# Patient Record
Sex: FEMALE | Race: WHITE | NOT HISPANIC OR LATINO | URBAN - METROPOLITAN AREA
[De-identification: names, ages, dates, MRNs, and addresses within clinical notes are randomized per-mention and may not be internally consistent; named-entity substitution may affect disease eponyms.]

---

## 2020-08-10 ENCOUNTER — TELEPHONE ENCOUNTER (OUTPATIENT)
Dept: URBAN - METROPOLITAN AREA CLINIC 9 | Facility: CLINIC | Age: 75
End: 2020-08-10

## 2020-08-10 ENCOUNTER — OFFICE VISIT (OUTPATIENT)
Dept: URBAN - METROPOLITAN AREA CLINIC 7 | Facility: CLINIC | Age: 75
End: 2020-08-10

## 2021-05-11 ENCOUNTER — OFFICE VISIT (OUTPATIENT)
Dept: URBAN - METROPOLITAN AREA SURGERY CENTER 5 | Facility: SURGERY CENTER | Age: 76
End: 2021-05-11

## 2021-06-22 ENCOUNTER — TELEPHONE ENCOUNTER (OUTPATIENT)
Dept: URBAN - METROPOLITAN AREA CLINIC 9 | Facility: CLINIC | Age: 76
End: 2021-06-22

## 2021-07-16 ENCOUNTER — OFFICE VISIT (OUTPATIENT)
Dept: URBAN - METROPOLITAN AREA CLINIC 7 | Facility: CLINIC | Age: 76
End: 2021-07-16

## 2022-07-07 ENCOUNTER — OFFICE VISIT (OUTPATIENT)
Dept: URBAN - METROPOLITAN AREA CLINIC 7 | Facility: CLINIC | Age: 77
End: 2022-07-07

## 2022-07-07 ENCOUNTER — TELEPHONE ENCOUNTER (OUTPATIENT)
Dept: URBAN - METROPOLITAN AREA CLINIC 9 | Facility: CLINIC | Age: 77
End: 2022-07-07

## 2022-07-08 ENCOUNTER — OFFICE VISIT (OUTPATIENT)
Dept: URBAN - METROPOLITAN AREA SURGERY CENTER 5 | Facility: SURGERY CENTER | Age: 77
End: 2022-07-08

## 2022-07-09 ENCOUNTER — TELEPHONE ENCOUNTER (OUTPATIENT)
Dept: URBAN - METROPOLITAN AREA CLINIC 121 | Facility: CLINIC | Age: 77
End: 2022-07-09

## 2022-07-10 ENCOUNTER — TELEPHONE ENCOUNTER (OUTPATIENT)
Dept: URBAN - METROPOLITAN AREA CLINIC 121 | Facility: CLINIC | Age: 77
End: 2022-07-10

## 2022-07-10 RX ORDER — DIAZEPAM 10 MG/1
TABLET ORAL
Refills: 0 | Status: ACTIVE | COMMUNITY
Start: 2012-06-13

## 2022-07-10 RX ORDER — CETIRIZINE HYDROCHLORIDE 10 MG/1
CAPSULE, LIQUID FILLED ORAL
Refills: 0 | Status: ACTIVE | COMMUNITY
Start: 2012-06-13

## 2022-07-10 RX ORDER — METOPROLOL SUCCINATE 50 MG/1
TABLET, EXTENDED RELEASE ORAL
Refills: 0 | Status: ACTIVE | COMMUNITY
Start: 2012-06-13

## 2022-07-10 RX ORDER — OMEPRAZOLE 20 MG/1
CAPSULE, DELAYED RELEASE ORAL TWICE A DAY
Refills: 3 | Status: ACTIVE | COMMUNITY
Start: 2012-06-13

## 2022-07-11 ENCOUNTER — OFFICE VISIT (OUTPATIENT)
Dept: URBAN - METROPOLITAN AREA SURGERY CENTER 5 | Facility: SURGERY CENTER | Age: 77
End: 2022-07-11

## 2022-07-13 ENCOUNTER — OFFICE VISIT (OUTPATIENT)
Dept: URBAN - METROPOLITAN AREA SURGERY CENTER 5 | Facility: SURGERY CENTER | Age: 77
End: 2022-07-13

## 2022-07-30 ENCOUNTER — TELEPHONE ENCOUNTER (OUTPATIENT)
Age: 77
End: 2022-07-30

## 2022-07-30 RX ORDER — OMEPRAZOLE 20 MG/1
2 (TWO) CAPSULE, DELAYED RELEASE ORAL
Qty: 0 | Refills: 2 | OUTPATIENT
Start: 2014-09-03 | End: 2014-10-03

## 2022-07-30 RX ORDER — PANTOPRAZOLE SODIUM 40 MG/1
1 (ONE) TABLET, DELAYED RELEASE ORAL
Qty: 0 | Refills: 13 | OUTPATIENT
Start: 2019-12-27 | End: 2020-08-10

## 2022-07-30 RX ORDER — ONDANSETRON HYDROCHLORIDE 4 MG/1
1 (ONE) TABLET, FILM COATED ORAL
Qty: 0 | Refills: 2 | OUTPATIENT
Start: 2012-09-06 | End: 2012-09-11

## 2022-07-30 RX ORDER — CEPHALEXIN 250 MG/5ML
5 CC FOR SUSPENSION ORAL
Qty: 0 | Refills: 2 | OUTPATIENT
Start: 2015-11-27 | End: 2015-12-07

## 2022-07-30 RX ORDER — POTASSIUM CHLORIDE 20 MEQ/15ML
15 SOLUTION ORAL
Qty: 0 | Refills: 11 | OUTPATIENT
Start: 2018-05-29 | End: 2019-06-03

## 2022-07-30 RX ORDER — CEPHALEXIN 250 MG/5ML
10 FOR SUSPENSION ORAL
Qty: 0 | Refills: 2 | OUTPATIENT
Start: 2019-08-13 | End: 2019-08-23

## 2022-07-30 RX ORDER — CEPHALEXIN 500 MG/1
1 (ONE) CAPSULE ORAL
Qty: 0 | Refills: 2 | OUTPATIENT
Start: 2015-12-17 | End: 2015-12-27

## 2022-07-30 RX ORDER — LACTOSE-REDUCED FOOD/FIBER 0.07 G-1.5
1 (ONE) LIQUID LIQUID (ML) ORAL
Qty: 0 | Refills: 2 | OUTPATIENT
Start: 2020-08-10 | End: 2020-11-08

## 2022-07-30 RX ORDER — FLUCONAZOLE 40 MG/ML
2.5 POWDER, FOR SUSPENSION ORAL DAILY
Qty: 0 | Refills: 2 | OUTPATIENT
Start: 2016-04-28 | End: 2016-05-08

## 2022-07-30 RX ORDER — LACTOSE-REDUCED FOOD/FIBER 0.07 G-1.5
1 (ONE) LIQUID LIQUID (ML) ORAL
Qty: 0 | Refills: 2 | OUTPATIENT
Start: 2016-08-31 | End: 2016-11-29

## 2022-07-30 RX ORDER — TRAMADOL HYDROCHLORIDE 50 MG/1
1 (ONE) TABLET ORAL
Qty: 0 | Refills: 2 | OUTPATIENT
Start: 2015-11-27 | End: 2015-12-02

## 2022-07-30 RX ORDER — FLUCONAZOLE 40 MG/ML
2.5 POWDER, FOR SUSPENSION ORAL DAILY
Qty: 0 | Refills: 2 | OUTPATIENT
Start: 2016-09-13 | End: 2016-09-23

## 2022-07-31 ENCOUNTER — TELEPHONE ENCOUNTER (OUTPATIENT)
Age: 77
End: 2022-07-31

## 2022-07-31 RX ORDER — MUPIROCIN 20 MG/G
1 (ONE) OINTMENT TOPICAL
Qty: 0 | Refills: 2 | Status: ACTIVE | COMMUNITY
Start: 2022-07-07

## 2022-07-31 RX ORDER — CEPHALEXIN 250 MG/5ML
10 FOR SUSPENSION ORAL
Qty: 0 | Refills: 2 | Status: ACTIVE | COMMUNITY
Start: 2019-08-13

## 2022-07-31 RX ORDER — LACTOSE-REDUCED FOOD/FIBER 0.07 G-1.5
1 (ONE) LIQUID (ML) ORAL
Qty: 0 | Refills: 2 | Status: ACTIVE | COMMUNITY
Start: 2016-08-31

## 2022-07-31 RX ORDER — TRAMADOL HYDROCHLORIDE 50 MG/1
1 (ONE) TABLET ORAL
Qty: 0 | Refills: 2 | Status: ACTIVE | COMMUNITY
Start: 2015-11-27

## 2022-07-31 RX ORDER — POTASSIUM CHLORIDE 20 MEQ/15ML
15 SOLUTION ORAL
Qty: 0 | Refills: 11 | Status: ACTIVE | COMMUNITY
Start: 2018-05-29

## 2022-07-31 RX ORDER — POTASSIUM CHLORIDE 20 MEQ/15ML
15 SOLUTION ORAL
Qty: 0 | Refills: 11 | Status: ACTIVE | COMMUNITY
Start: 2017-05-04

## 2022-07-31 RX ORDER — LACTOSE-REDUCED FOOD/FIBER 0.07 G-1.5
1 (ONE) LIQUID LIQUID (ML) ORAL
Qty: 0 | Refills: 2 | Status: ACTIVE | COMMUNITY
Start: 2020-08-10

## 2022-07-31 RX ORDER — ONDANSETRON HYDROCHLORIDE 4 MG/1
1 (ONE) TABLET, FILM COATED ORAL
Qty: 0 | Refills: 2 | Status: ACTIVE | COMMUNITY
Start: 2012-09-06

## 2022-07-31 RX ORDER — FLUCONAZOLE 40 MG/ML
2.5 POWDER, FOR SUSPENSION ORAL DAILY
Qty: 0 | Refills: 2 | Status: ACTIVE | COMMUNITY
Start: 2016-04-28

## 2022-07-31 RX ORDER — CEPHALEXIN 250 MG/5ML
5 CC FOR SUSPENSION ORAL
Qty: 0 | Refills: 2 | Status: ACTIVE | COMMUNITY
Start: 2015-11-27

## 2022-07-31 RX ORDER — CEPHALEXIN 500 MG/1
1 (ONE) CAPSULE ORAL
Qty: 0 | Refills: 2 | Status: ACTIVE | COMMUNITY
Start: 2015-12-17

## 2022-07-31 RX ORDER — FLUCONAZOLE 40 MG/ML
2.5 POWDER, FOR SUSPENSION ORAL DAILY
Qty: 0 | Refills: 2 | Status: ACTIVE | COMMUNITY
Start: 2016-09-13

## 2022-07-31 RX ORDER — LACTOSE-REDUCED FOOD/FIBER 0.07 G-1.5
1 (ONE) LIQUID (ML) ORAL
Qty: 0 | Refills: 16 | Status: ACTIVE | COMMUNITY
Start: 2016-08-30

## 2022-07-31 RX ORDER — PANTOPRAZOLE SODIUM 40 MG/1
1 (ONE) TABLET, DELAYED RELEASE ORAL
Qty: 0 | Refills: 13 | Status: ACTIVE | COMMUNITY
Start: 2019-12-27

## 2022-07-31 RX ORDER — OMEPRAZOLE 20 MG/1
2 (TWO) CAPSULE, DELAYED RELEASE ORAL
Qty: 0 | Refills: 2 | Status: ACTIVE | COMMUNITY
Start: 2014-09-03

## 2022-09-28 ENCOUNTER — OFFICE VISIT (OUTPATIENT)
Dept: URBAN - METROPOLITAN AREA CLINIC 7 | Facility: CLINIC | Age: 77
End: 2022-09-28

## 2022-12-27 ENCOUNTER — OFFICE VISIT (OUTPATIENT)
Dept: URBAN - METROPOLITAN AREA CLINIC 7 | Facility: CLINIC | Age: 77
End: 2022-12-27

## 2023-02-10 ENCOUNTER — OFFICE VISIT (OUTPATIENT)
Dept: URBAN - METROPOLITAN AREA CLINIC 7 | Facility: CLINIC | Age: 78
End: 2023-02-10

## 2023-03-17 ENCOUNTER — WEB ENCOUNTER (OUTPATIENT)
Dept: URBAN - METROPOLITAN AREA CLINIC 7 | Facility: CLINIC | Age: 78
End: 2023-03-17

## 2023-03-17 ENCOUNTER — DASHBOARD ENCOUNTERS (OUTPATIENT)
Age: 78
End: 2023-03-17

## 2023-03-17 ENCOUNTER — LAB OUTSIDE AN ENCOUNTER (OUTPATIENT)
Dept: URBAN - METROPOLITAN AREA CLINIC 7 | Facility: CLINIC | Age: 78
End: 2023-03-17

## 2023-03-17 ENCOUNTER — OFFICE VISIT (OUTPATIENT)
Dept: URBAN - METROPOLITAN AREA CLINIC 7 | Facility: CLINIC | Age: 78
End: 2023-03-17
Payer: MEDICARE

## 2023-03-17 VITALS — RESPIRATION RATE: 16 BRPM | TEMPERATURE: 97.7 F | WEIGHT: 135 LBS | BODY MASS INDEX: 23.92 KG/M2 | HEIGHT: 63 IN

## 2023-03-17 DIAGNOSIS — Z86.010 PERSONAL HISTORY OF COLONIC POLYPS: ICD-10-CM

## 2023-03-17 DIAGNOSIS — K94.23 PEG TUBE MALFUNCTION: ICD-10-CM

## 2023-03-17 DIAGNOSIS — R13.12 OROPHARYNGEAL DYSPHAGIA: ICD-10-CM

## 2023-03-17 DIAGNOSIS — Z86.16 HISTORY OF COVID-19: ICD-10-CM

## 2023-03-17 PROBLEM — 292508471000119105: Status: ACTIVE | Noted: 2023-03-17

## 2023-03-17 PROBLEM — 71457002: Status: ACTIVE | Noted: 2023-03-17

## 2023-03-17 PROCEDURE — 99214 OFFICE O/P EST MOD 30 MIN: CPT | Performed by: INTERNAL MEDICINE

## 2023-03-17 RX ORDER — LACTOSE-REDUCED FOOD/FIBER 0.07 G-1.5
1 (ONE) LIQUID LIQUID (ML) ORAL
Qty: 0 | Refills: 2 | Status: ON HOLD | COMMUNITY
Start: 2020-08-10

## 2023-03-17 RX ORDER — POTASSIUM CHLORIDE 20 MEQ/15ML
15 ML WITH FOOD SOLUTION ORAL ONCE A DAY
Status: ACTIVE | COMMUNITY

## 2023-03-17 RX ORDER — PANTOPRAZOLE SODIUM 40 MG/1
1 (ONE) TABLET, DELAYED RELEASE ORAL
Qty: 0 | Refills: 13 | Status: ON HOLD | COMMUNITY
Start: 2019-12-27

## 2023-03-17 RX ORDER — METOPROLOL SUCCINATE 50 MG/1
TABLET, EXTENDED RELEASE ORAL
Refills: 0 | Status: ACTIVE | COMMUNITY
Start: 2012-06-13

## 2023-03-17 RX ORDER — FLUCONAZOLE 40 MG/ML
2.5 POWDER, FOR SUSPENSION ORAL DAILY
Qty: 0 | Refills: 2 | Status: ON HOLD | COMMUNITY
Start: 2016-04-28

## 2023-03-17 RX ORDER — MUPIROCIN 20 MG/G
1 (ONE) OINTMENT TOPICAL
Qty: 0 | Refills: 2 | Status: ON HOLD | COMMUNITY
Start: 2022-07-07

## 2023-03-17 RX ORDER — CETIRIZINE HYDROCHLORIDE 10 MG/1
CAPSULE, LIQUID FILLED ORAL
Refills: 0 | Status: ACTIVE | COMMUNITY
Start: 2012-06-13

## 2023-03-17 RX ORDER — OMEPRAZOLE 20 MG/1
2 (TWO) CAPSULE, DELAYED RELEASE ORAL
Qty: 0 | Refills: 2 | Status: ON HOLD | COMMUNITY
Start: 2014-09-03

## 2023-03-17 RX ORDER — CEPHALEXIN 250 MG/5ML
5 CC FOR SUSPENSION ORAL
Qty: 0 | Refills: 2 | Status: ON HOLD | COMMUNITY
Start: 2015-11-27

## 2023-03-17 RX ORDER — TRAMADOL HYDROCHLORIDE 50 MG/1
1 (ONE) TABLET ORAL
Qty: 0 | Refills: 2 | Status: ON HOLD | COMMUNITY
Start: 2015-11-27

## 2023-03-17 RX ORDER — DIAZEPAM 10 MG/1
TABLET ORAL
Refills: 0 | Status: ACTIVE | COMMUNITY
Start: 2012-06-13

## 2023-03-17 RX ORDER — CEPHALEXIN 500 MG/1
1 (ONE) CAPSULE ORAL
Qty: 0 | Refills: 2 | Status: ON HOLD | COMMUNITY
Start: 2015-12-17

## 2023-03-17 RX ORDER — ONDANSETRON HYDROCHLORIDE 4 MG/1
1 (ONE) TABLET, FILM COATED ORAL
Qty: 0 | Refills: 2 | Status: ON HOLD | COMMUNITY
Start: 2012-09-06

## 2023-03-17 RX ORDER — POTASSIUM CHLORIDE 20 MEQ/15ML
15 SOLUTION ORAL
Qty: 0 | Refills: 11 | Status: ON HOLD | COMMUNITY
Start: 2018-05-29

## 2023-03-17 NOTE — PHYSICAL EXAM GASTROINTESTINAL
Abdomen , soft, nontender, nondistended , no guarding or rigidity , no masses palpable , normal bowel sounds , Liver and Spleen,  no hepatosplenomegaly , liver nontender erythema at stoma of PEG,no skin breakdown. No bleeding.

## 2023-03-17 NOTE — HPI-TODAY'S VISIT:
Patient is seen today with a complaint of abdominal  pain/vague discomfort at PEG site. Is s/p recent admission [facility] with respirator failure secondary to COvid. During admision had Pain at PEG site and was seen in surgical opinion Dr Segovia. CT without finding. Intiated on topical rx for peristomal infection and has noted improvement but still some discofort. On exam PEG looks inact and is easily manipulated. The pain is of  weeks  duration. The pain is described as vague ache. It is localized to the  LUQ  It is non radiating  No exacerbating factors - unrelated to eating,change in position ,activity. Relieving factors - none. Severity mild to moderate. Not associated with nausea,vomiting ,early satiety and dyspepsia. No recent fevers or chills No weight loss No change in bowel habits. No dark urine or alcoholic stools. No significant NSAID use history.

## 2023-03-24 ENCOUNTER — TELEPHONE ENCOUNTER (OUTPATIENT)
Dept: URBAN - METROPOLITAN AREA CLINIC 7 | Facility: CLINIC | Age: 78
End: 2023-03-24

## 2023-03-27 ENCOUNTER — LAB OUTSIDE AN ENCOUNTER (OUTPATIENT)
Dept: URBAN - METROPOLITAN AREA CLINIC 7 | Facility: CLINIC | Age: 78
End: 2023-03-27

## 2023-03-28 ENCOUNTER — TELEPHONE ENCOUNTER (OUTPATIENT)
Dept: URBAN - METROPOLITAN AREA CLINIC 7 | Facility: CLINIC | Age: 78
End: 2023-03-28

## 2024-08-16 ENCOUNTER — OFFICE VISIT (OUTPATIENT)
Dept: URBAN - METROPOLITAN AREA CLINIC 7 | Facility: CLINIC | Age: 79
End: 2024-08-16
Payer: MEDICARE

## 2024-08-16 VITALS
BODY MASS INDEX: 22.68 KG/M2 | SYSTOLIC BLOOD PRESSURE: 118 MMHG | TEMPERATURE: 97 F | DIASTOLIC BLOOD PRESSURE: 84 MMHG | HEIGHT: 63 IN | WEIGHT: 128 LBS

## 2024-08-16 DIAGNOSIS — Z87.11 HX OF GASTRIC ULCER: ICD-10-CM

## 2024-08-16 DIAGNOSIS — Z97.8 USES FEEDING TUBE: ICD-10-CM

## 2024-08-16 DIAGNOSIS — K21.9 GASTROESOPHAGEAL REFLUX DISEASE: ICD-10-CM

## 2024-08-16 DIAGNOSIS — Z86.010 HISTORY OF COLONIC POLYPS: ICD-10-CM

## 2024-08-16 DIAGNOSIS — R13.10 DYSPHAGIA, UNSPECIFIED TYPE: ICD-10-CM

## 2024-08-16 DIAGNOSIS — Z86.16 HISTORY OF COVID-19: ICD-10-CM

## 2024-08-16 PROBLEM — 440419004: Status: ACTIVE | Noted: 2024-08-16

## 2024-08-16 PROBLEM — 275548000: Status: ACTIVE | Noted: 2024-08-16

## 2024-08-16 PROCEDURE — 99214 OFFICE O/P EST MOD 30 MIN: CPT | Performed by: INTERNAL MEDICINE

## 2024-08-16 RX ORDER — POTASSIUM CHLORIDE 20 MEQ/15ML
15 ML WITH FOOD SOLUTION ORAL ONCE A DAY
Status: ON HOLD | COMMUNITY

## 2024-08-16 RX ORDER — CETIRIZINE HYDROCHLORIDE 10 MG/1
CAPSULE, LIQUID FILLED ORAL
Refills: 0 | Status: ACTIVE | COMMUNITY
Start: 2012-06-13

## 2024-08-16 RX ORDER — DIAZEPAM 10 MG/1
TABLET ORAL
Refills: 0 | Status: ACTIVE | COMMUNITY
Start: 2012-06-13

## 2024-08-16 RX ORDER — FLUCONAZOLE 40 MG/ML
2.5 POWDER, FOR SUSPENSION ORAL DAILY
Qty: 0 | Refills: 2 | Status: ON HOLD | COMMUNITY
Start: 2016-04-28

## 2024-08-16 RX ORDER — CEPHALEXIN 250 MG/5ML
5 CC FOR SUSPENSION ORAL
Qty: 0 | Refills: 2 | Status: ON HOLD | COMMUNITY
Start: 2015-11-27

## 2024-08-16 RX ORDER — OMEPRAZOLE 20 MG/1
2 (TWO) CAPSULE, DELAYED RELEASE ORAL
Qty: 0 | Refills: 2 | Status: ACTIVE | COMMUNITY
Start: 2014-09-03

## 2024-08-16 RX ORDER — CEPHALEXIN 500 MG/1
1 (ONE) CAPSULE ORAL
Qty: 0 | Refills: 2 | Status: ON HOLD | COMMUNITY
Start: 2015-12-17

## 2024-08-16 RX ORDER — ONDANSETRON HYDROCHLORIDE 4 MG/1
1 (ONE) TABLET, FILM COATED ORAL
Qty: 0 | Refills: 2 | Status: ON HOLD | COMMUNITY
Start: 2012-09-06

## 2024-08-16 RX ORDER — MUPIROCIN 20 MG/G
1 (ONE) OINTMENT TOPICAL
Qty: 0 | Refills: 2 | Status: ON HOLD | COMMUNITY
Start: 2022-07-07

## 2024-08-16 RX ORDER — LACTOSE-REDUCED FOOD/FIBER 0.07 G-1.5
1 (ONE) LIQUID LIQUID (ML) ORAL
Qty: 0 | Refills: 2 | Status: ACTIVE | COMMUNITY
Start: 2020-08-10

## 2024-08-16 RX ORDER — METOPROLOL SUCCINATE 50 MG/1
TABLET, EXTENDED RELEASE ORAL
Refills: 0 | Status: ACTIVE | COMMUNITY
Start: 2012-06-13

## 2024-08-16 RX ORDER — POTASSIUM CHLORIDE 20 MEQ/15ML
15 SOLUTION ORAL
Qty: 0 | Refills: 11 | Status: ON HOLD | COMMUNITY
Start: 2018-05-29

## 2024-08-16 RX ORDER — TRAMADOL HYDROCHLORIDE 50 MG/1
1 (ONE) TABLET ORAL
Qty: 0 | Refills: 2 | Status: ON HOLD | COMMUNITY
Start: 2015-11-27

## 2024-08-16 NOTE — HPI-TODAY'S VISIT:
In october was admitted with recurrent resistant UTI. During admission IR replaced feeding tube . In april had repeat admission and was seen by GI for blood in stool. EGD was done with finding of gastric ulceration. Had inpatient colonoscopy. DC summary reviwed Remains on omeprazole. No current gi complaints  Had prior  admission [facility] with respirator failure secondary to COvid. During admision had Pain at PEG site and was seen in surgical opinion Dr Segovia. CT without finding. Intiated on topical rx for peristomal infection and has noted improvement but still some discofort. On exam PEG looks inact and is easily manipulated. The pain is of  weeks  duration. The pain is described as vague ache. It is localized to the  LUQ  It is non radiating  No exacerbating factors - unrelated to eating,change in position ,activity. Relieving factors - none. Severity mild to moderate. Not associated with nausea,vomiting ,early satiety and dyspepsia. No recent fevers or chills No weight loss No change in bowel habits. No dark urine or alcoholic stools. No significant NSAID use history.

## 2024-08-16 NOTE — PHYSICAL EXAM GASTROINTESTINAL
Abdomen , soft, nontender, nondistended , no guarding or rigidity , no masses palpable , normal bowel sounds , Liver and Spleen,  no hepatosplenomegaly , liver nontender feeding tube in place

## 2025-01-27 ENCOUNTER — TELEPHONE ENCOUNTER (OUTPATIENT)
Dept: URBAN - METROPOLITAN AREA CLINIC 7 | Facility: CLINIC | Age: 80
End: 2025-01-27

## 2025-01-29 ENCOUNTER — LAB OUTSIDE AN ENCOUNTER (OUTPATIENT)
Dept: URBAN - METROPOLITAN AREA CLINIC 7 | Facility: CLINIC | Age: 80
End: 2025-01-29

## 2025-01-29 ENCOUNTER — OFFICE VISIT (OUTPATIENT)
Dept: URBAN - METROPOLITAN AREA CLINIC 7 | Facility: CLINIC | Age: 80
End: 2025-01-29
Payer: MEDICARE

## 2025-01-29 ENCOUNTER — TELEPHONE ENCOUNTER (OUTPATIENT)
Dept: URBAN - METROPOLITAN AREA CLINIC 7 | Facility: CLINIC | Age: 80
End: 2025-01-29

## 2025-01-29 VITALS
SYSTOLIC BLOOD PRESSURE: 130 MMHG | HEIGHT: 63 IN | BODY MASS INDEX: 23.04 KG/M2 | OXYGEN SATURATION: 90 % | DIASTOLIC BLOOD PRESSURE: 80 MMHG | RESPIRATION RATE: 90 BRPM | TEMPERATURE: 97.6 F | WEIGHT: 130 LBS

## 2025-01-29 DIAGNOSIS — Z93.1 PEG (PERCUTANEOUS ENDOSCOPIC GASTROSTOMY) STATUS: ICD-10-CM

## 2025-01-29 DIAGNOSIS — N39.0 RECURRENT UTI: ICD-10-CM

## 2025-01-29 DIAGNOSIS — L08.9 SKIN INFECTION: ICD-10-CM

## 2025-01-29 DIAGNOSIS — Z97.8 USES FEEDING TUBE: ICD-10-CM

## 2025-01-29 DIAGNOSIS — R10.84 GENERALIZED ABDOMINAL PAIN: ICD-10-CM

## 2025-01-29 PROBLEM — 108365000: Status: ACTIVE | Noted: 2025-01-29

## 2025-01-29 PROBLEM — 197927001: Status: ACTIVE | Noted: 2025-01-29

## 2025-01-29 PROBLEM — 102614006: Status: ACTIVE | Noted: 2025-01-29

## 2025-01-29 PROCEDURE — 99214 OFFICE O/P EST MOD 30 MIN: CPT | Performed by: INTERNAL MEDICINE

## 2025-01-29 RX ORDER — CEPHALEXIN 250 MG/5ML
5 CC FOR SUSPENSION ORAL
Qty: 0 | Refills: 2 | Status: ON HOLD | COMMUNITY
Start: 2015-11-27

## 2025-01-29 RX ORDER — ONDANSETRON HYDROCHLORIDE 4 MG/1
1 (ONE) TABLET, FILM COATED ORAL
Qty: 0 | Refills: 2 | Status: ON HOLD | COMMUNITY
Start: 2012-09-06

## 2025-01-29 RX ORDER — POTASSIUM CHLORIDE 20 MEQ/15ML
15 ML WITH FOOD SOLUTION ORAL ONCE A DAY
Status: ON HOLD | COMMUNITY

## 2025-01-29 RX ORDER — OMEPRAZOLE 20 MG/1
2 (TWO) CAPSULE, DELAYED RELEASE ORAL
Qty: 0 | Refills: 2 | Status: ACTIVE | COMMUNITY
Start: 2014-09-03

## 2025-01-29 RX ORDER — LATANOPROST 50 UG/ML
1 DROP INTO AFFECTED EYE IN THE EVENING SOLUTION/ DROPS OPHTHALMIC ONCE A DAY
Status: ACTIVE | COMMUNITY
Start: 2025-01-29

## 2025-01-29 RX ORDER — DIAZEPAM 10 MG/1
TABLET ORAL
Refills: 0 | Status: ACTIVE | COMMUNITY
Start: 2012-06-13

## 2025-01-29 RX ORDER — CETIRIZINE HYDROCHLORIDE 10 MG/1
CAPSULE, LIQUID FILLED ORAL
Refills: 0 | Status: ACTIVE | COMMUNITY
Start: 2012-06-13

## 2025-01-29 RX ORDER — CEPHALEXIN 500 MG/1
1 (ONE) CAPSULE ORAL
Qty: 0 | Refills: 2 | Status: ON HOLD | COMMUNITY
Start: 2015-12-17

## 2025-01-29 RX ORDER — FLUCONAZOLE 40 MG/ML
2.5 POWDER, FOR SUSPENSION ORAL DAILY
Qty: 0 | Refills: 2 | Status: ON HOLD | COMMUNITY
Start: 2016-04-28

## 2025-01-29 RX ORDER — MUPIROCIN 20 MG/G
1 (ONE) OINTMENT TOPICAL
Qty: 0 | Refills: 2 | Status: ON HOLD | COMMUNITY
Start: 2022-07-07

## 2025-01-29 RX ORDER — METOPROLOL SUCCINATE 50 MG/1
TABLET, EXTENDED RELEASE ORAL
Refills: 0 | Status: ACTIVE | COMMUNITY
Start: 2012-06-13

## 2025-01-29 RX ORDER — MUPIROCIN 20 MG/G
1 APPLICATION OINTMENT TOPICAL TWICE A DAY
OUTPATIENT
Start: 2025-01-29 | End: 2025-02-03

## 2025-01-29 RX ORDER — LACTOSE-REDUCED FOOD/FIBER 0.07 G-1.5
1 (ONE) LIQUID LIQUID (ML) ORAL
Qty: 0 | Refills: 2 | Status: ACTIVE | COMMUNITY
Start: 2020-08-10

## 2025-01-29 RX ORDER — TRAMADOL HYDROCHLORIDE 50 MG/1
1 (ONE) TABLET ORAL
Qty: 0 | Refills: 2 | Status: ON HOLD | COMMUNITY
Start: 2015-11-27

## 2025-01-29 RX ORDER — POTASSIUM CHLORIDE 20 MEQ/15ML
15 SOLUTION ORAL
Qty: 0 | Refills: 11 | Status: ON HOLD | COMMUNITY
Start: 2018-05-29

## 2025-01-29 NOTE — HPI-TODAY'S VISIT:
Over last days has noted leakage and some blood from region of PEG stoma. On exam is aprox 3cm of circumferential erythema and exoriation of skin around the stoma. The external bumper is tight and was loosened. Tude contains inpisated material but not easily flushed. No pain with turning or moving tube but is having abd pain that has been intermittent and recurrent. No kory or chills No nausea or vomiting. Has used topical rx to date  In october was admitted with recurrent resistant UTI. During admission IR replaced feeding tube . In april had repeat admission and was seen by GI for blood in stool. EGD was done with finding of gastric ulceration. Had inpatient colonoscopy. DC summary reviwed Remains on omeprazole.   Had prior  admission [facility] with respirator failure secondary to COvid. During admision had Pain at PEG site and was seen in surgical opinion Dr Segovia. CT without finding. Intiated on topical rx for peristomal infection and has noted improvement but still some discofort. On exam PEG looks inact and is easily manipulated. The pain is of  weeks  duration. The pain is described as vague ache. It is localized to the  LUQ  It is non radiating  No exacerbating factors - unrelated to eating,change in position ,activity. Relieving factors - none. Severity mild to moderate. Not associated with nausea,vomiting ,early satiety and dyspepsia. No recent fevers or chills No weight loss No change in bowel habits. No dark urine or alcoholic stools. No significant NSAID use history.

## 2025-01-29 NOTE — PHYSICAL EXAM GASTROINTESTINAL
Abdomen , soft, nontender, nondistended , no guarding or rigidity , no masses palpable , normal bowel sounds , Liver and Spleen,  no hepatosplenomegaly , liver nontender PEG in LUQ with inflammatory change around stoma.

## 2025-01-30 ENCOUNTER — TELEPHONE ENCOUNTER (OUTPATIENT)
Dept: URBAN - METROPOLITAN AREA CLINIC 7 | Facility: CLINIC | Age: 80
End: 2025-01-30

## 2025-02-14 ENCOUNTER — TELEPHONE ENCOUNTER (OUTPATIENT)
Dept: URBAN - METROPOLITAN AREA CLINIC 7 | Facility: CLINIC | Age: 80
End: 2025-02-14

## 2025-04-23 ENCOUNTER — TELEPHONE ENCOUNTER (OUTPATIENT)
Dept: URBAN - METROPOLITAN AREA CLINIC 7 | Facility: CLINIC | Age: 80
End: 2025-04-23